# Patient Record
Sex: FEMALE | Race: WHITE | NOT HISPANIC OR LATINO | Employment: OTHER | URBAN - METROPOLITAN AREA
[De-identification: names, ages, dates, MRNs, and addresses within clinical notes are randomized per-mention and may not be internally consistent; named-entity substitution may affect disease eponyms.]

---

## 2019-09-26 ENCOUNTER — OFFICE VISIT (OUTPATIENT)
Dept: ENDOCRINOLOGY | Facility: CLINIC | Age: 74
End: 2019-09-26

## 2019-09-26 VITALS
OXYGEN SATURATION: 99 % | DIASTOLIC BLOOD PRESSURE: 66 MMHG | WEIGHT: 213 LBS | HEART RATE: 74 BPM | SYSTOLIC BLOOD PRESSURE: 108 MMHG

## 2019-09-26 DIAGNOSIS — E04.1 SOLITARY THYROID NODULE: ICD-10-CM

## 2019-09-26 DIAGNOSIS — E05.90 HYPERTHYROIDISM: Primary | ICD-10-CM

## 2019-09-26 PROBLEM — I48.91 ATRIAL FIBRILLATION (HCC): Status: ACTIVE | Noted: 2019-09-26

## 2019-09-26 PROBLEM — I10 ESSENTIAL HYPERTENSION: Status: ACTIVE | Noted: 2019-09-26

## 2019-09-26 PROCEDURE — G0008 ADMIN INFLUENZA VIRUS VAC: HCPCS | Performed by: INTERNAL MEDICINE

## 2019-09-26 PROCEDURE — 90653 IIV ADJUVANT VACCINE IM: CPT | Performed by: INTERNAL MEDICINE

## 2019-09-26 PROCEDURE — 99213 OFFICE O/P EST LOW 20 MIN: CPT | Performed by: INTERNAL MEDICINE

## 2019-09-26 RX ORDER — METHIMAZOLE 5 MG/1
TABLET ORAL
Refills: 5 | COMMUNITY
Start: 2019-09-10 | End: 2019-09-26 | Stop reason: SDUPTHER

## 2019-09-26 RX ORDER — LOSARTAN POTASSIUM 100 MG/1
100 TABLET ORAL DAILY
Refills: 2 | COMMUNITY
Start: 2019-08-28

## 2019-09-26 RX ORDER — IRON POLYSACCHARIDE COMPLEX 150 MG
150 CAPSULE ORAL 2 TIMES DAILY
Refills: 11 | COMMUNITY
Start: 2019-09-21

## 2019-09-26 RX ORDER — CALCIUM ACETATE 667 MG/1
667 CAPSULE ORAL 2 TIMES DAILY
Refills: 11 | COMMUNITY
Start: 2019-09-21

## 2019-09-26 RX ORDER — FUROSEMIDE 40 MG/1
40 TABLET ORAL 2 TIMES DAILY
Refills: 1 | COMMUNITY
Start: 2019-06-25 | End: 2022-02-16

## 2019-09-26 RX ORDER — AMLODIPINE BESYLATE 2.5 MG/1
2.5 TABLET ORAL DAILY
Refills: 11 | COMMUNITY
Start: 2019-09-21

## 2019-09-26 RX ORDER — METHIMAZOLE 5 MG/1
5 TABLET ORAL DAILY
Qty: 90 TABLET | Refills: 1 | Status: SHIPPED | OUTPATIENT
Start: 2019-09-26 | End: 2020-04-20

## 2019-09-26 RX ORDER — METOPROLOL SUCCINATE 25 MG/1
25 TABLET, EXTENDED RELEASE ORAL DAILY
Refills: 11 | COMMUNITY
Start: 2019-09-21 | End: 2021-03-29

## 2019-09-26 NOTE — PROGRESS NOTES
Chief Complaint   Patient presents with   • Follow-up   • Hyperthyroidism        HPI   Mariposa Scales is a 74 y.o. female had concerns including Follow-up and Hyperthyroidism.    Here today for follow-up on hyperthyroidism.  She has a history of undetectable TSH.  Most recent TSH has trended up slightly to 4.5 from 8/20/2019.  Free T4 slightly low at 0.68.  Currently she is on methimazole 7.5 mg daily.  She had an uptake and scan on 1119 which showed normal uptake values with a mildly nodular increased thyroid uptake in the left upper lobe.  Thyroid ultrasound was completed on 2/21/2019 and showed a left 0.9 x 0.6 x 0.9 cm nodule with a diffusely heterogeneous thyroid.  Thyroid antibodies were checked in the past and were normal.  She is feeling well but does complain of some increase in fatigue.  Denies other new concerns or complaints today.    The following portions of the patient's history were reviewed and updated as appropriate: allergies, current medications, past family history, past medical history, past social history, past surgical history and problem list.    Review of Systems   Constitutional: Positive for fatigue.   HENT: Negative.    Respiratory: Negative.    Cardiovascular: Negative.    Gastrointestinal: Negative.    Endocrine: Negative.    Genitourinary: Negative.    Musculoskeletal: Negative.    Skin: Negative.    Allergic/Immunologic: Negative.    Neurological: Negative for dizziness, tremors, seizures, syncope, facial asymmetry, speech difficulty, weakness, light-headedness, numbness, headache, memory problem and confusion.   Hematological: Negative for adenopathy. Bruises/bleeds easily.   Psychiatric/Behavioral: Negative.       ROS was reviewed and verified. All other ROS negative.    /66   Pulse 74   Wt 96.6 kg (213 lb)   SpO2 99%   Breastfeeding? No      Physical Exam     Constitutional:  well developed; well nourished  no acute distress       Eyes: EOM intact  Conjunctiva: clear    Respiratory:  breathing is unlabored   Cardiovascular:  Not performed.   Chest:  Not performed.   Abdomen: Not performed.   : Not performed.   Musculoskeletal: negative findings:  ROM of all joints is normal, no deformities present   Skin: dry and warm   Neuro: normal without focal findings, mental status, speech normal, alert and oriented x3 and STEWART   Psych: oriented to time, place and person, mood and affect are within normal limits     Labs and imaging reviewed as outlined in the HPI.    Assessment and Plan    Mariposa was seen today for follow-up and hyperthyroidism.    Diagnoses and all orders for this visit:    Hyperthyroidism  Uptake and scan suggested possible toxic nodule, but ultrasound was not impressive for a nodule which is typically seen responsible for hyperthyroidism.  However, her thyroid antibodies are negative.  TSH was undetectable prior to treatment and she has a history of atrial fibrillation.  Most recent TSH trended up to 4.5 and free T4 slightly low at 0.68.  Decrease methimazole to 5 mg daily and recheck labs in 2 months.  -     methIMAzole (TAPAZOLE) 5 MG tablet; Take 1 tablet by mouth Daily.  -     TSH; Future  -     T4, Free; Future    Solitary thyroid nodule  Us from February 2019 showed a 0.9 0.6 x 0.9 cm left thyroid nodule.  Repeat ultrasound at follow-up.    Return in about 4 months (around 1/26/2020) for next scheduled follow up, Labs in 2 months. The patient was instructed to contact the clinic with any interval questions or concerns.    Ellie France,    Endocrinologist    Please note that portions of this document were completed with a voice recognition program. Efforts were made to edit the dictations, but occasionally words are mis-transcribed.

## 2019-09-26 NOTE — PATIENT INSTRUCTIONS
Decrease dose of methimazole to 5 mg (one tablet) daily and have labs rechecked in 2 months. If you haven't heard from our office a week after the labs were drawn, please call to ensure we received the results.

## 2019-11-26 ENCOUNTER — RESULTS ENCOUNTER (OUTPATIENT)
Dept: ENDOCRINOLOGY | Facility: CLINIC | Age: 74
End: 2019-11-26

## 2019-11-26 DIAGNOSIS — E05.90 HYPERTHYROIDISM: ICD-10-CM

## 2020-04-18 DIAGNOSIS — E05.90 HYPERTHYROIDISM: ICD-10-CM

## 2020-04-20 DIAGNOSIS — E05.90 HYPERTHYROIDISM: ICD-10-CM

## 2020-04-20 RX ORDER — METHIMAZOLE 5 MG/1
5 TABLET ORAL DAILY
Qty: 90 TABLET | Refills: 0 | Status: SHIPPED | OUTPATIENT
Start: 2020-04-20 | End: 2020-06-29 | Stop reason: SDUPTHER

## 2020-04-20 RX ORDER — METHIMAZOLE 5 MG/1
TABLET ORAL
Qty: 90 TABLET | Refills: 0 | Status: SHIPPED | OUTPATIENT
Start: 2020-04-20 | End: 2020-04-20 | Stop reason: SDUPTHER

## 2020-06-25 ENCOUNTER — OFFICE VISIT (OUTPATIENT)
Dept: ENDOCRINOLOGY | Facility: CLINIC | Age: 75
End: 2020-06-25

## 2020-06-25 ENCOUNTER — LAB (OUTPATIENT)
Dept: LAB | Facility: HOSPITAL | Age: 75
End: 2020-06-25

## 2020-06-25 VITALS
WEIGHT: 228.6 LBS | OXYGEN SATURATION: 98 % | DIASTOLIC BLOOD PRESSURE: 64 MMHG | SYSTOLIC BLOOD PRESSURE: 160 MMHG | HEART RATE: 74 BPM

## 2020-06-25 DIAGNOSIS — E04.0 GOITER DIFFUSE: Primary | ICD-10-CM

## 2020-06-25 DIAGNOSIS — E05.90 HYPERTHYROIDISM: ICD-10-CM

## 2020-06-25 PROCEDURE — 84443 ASSAY THYROID STIM HORMONE: CPT | Performed by: INTERNAL MEDICINE

## 2020-06-25 PROCEDURE — 99214 OFFICE O/P EST MOD 30 MIN: CPT | Performed by: INTERNAL MEDICINE

## 2020-06-25 PROCEDURE — 84439 ASSAY OF FREE THYROXINE: CPT | Performed by: INTERNAL MEDICINE

## 2020-06-25 PROCEDURE — 76536 US EXAM OF HEAD AND NECK: CPT | Performed by: INTERNAL MEDICINE

## 2020-06-25 NOTE — PROGRESS NOTES
Chief Complaint   Patient presents with   • Follow-up   • Hyperthyroidism and US        HPI   Mariposa Scales is a 74 y.o. female had concerns including Follow-up and Hyperthyroidism and US.      Patient here today for follow-up on hyperthyroidism.  She is also due for repeat thyroid ultrasound.    She is on methimazole 5 mg once daily.  Reports that she had her thyroid levels checked since her last visit here but we never received these labs.  This was several months ago.  She feels well and denies any new concerns or complaints.  Has persistent leg swelling, worse on the right than the left.  This has been chronic and persistent for many years.    Last thyroid ultrasound was completed in February 2019 and showed a left 0.9 x 0.6 x 0.9 cm nodule.  There was some suggestion on uptake and scan from 2/11/2019 that there may be increased uptake in the left upper lobe of the thyroid.  Thyroid antibodies have been negative.    The following portions of the patient's history were reviewed and updated as appropriate: allergies, current medications, past family history, past medical history, past social history, past surgical history and problem list.    Review of Systems   Constitutional: Negative.    HENT: Negative.    Eyes: Negative.    Respiratory: Negative.    Cardiovascular: Positive for leg swelling (R leg has been swelling).   Gastrointestinal: Negative.    Endocrine: Negative.    Genitourinary: Negative.    Musculoskeletal: Negative.    Skin: Negative.    Allergic/Immunologic: Negative.    Neurological: Negative.    Hematological: Negative.    Psychiatric/Behavioral: Negative.       ROS was reviewed and verified. All other ROS negative.    /64   Pulse 74   Wt 104 kg (228 lb 9.6 oz)   SpO2 98%      Physical Exam     Constitutional:  well developed; well nourished  no acute distress  obese - There is no height or weight on file to calculate BMI.   ENT/Thyroid: no thyromegaly  no palpable nodules   Eyes: EOM  intact  Conjunctiva: clear   Respiratory:  breathing is unlabored  clear to auscultation bilaterally   Cardiovascular:  regular rate and rhythm, S1, S2 normal, no murmur, click, rub or gallop   Chest:  Not performed.   Abdomen: Not performed.   : Not performed.   Musculoskeletal: negative findings:  ROM of all joints is normal, no deformities present   Skin: dry and warm   Neuro: normal without focal findings, mental status, speech normal, alert and oriented x3 and STEWART   Psych: oriented to time, place and person, mood and affect are within normal limits     Thyroid Ultrasound 20    Indication: hyperthyroidism, thyroid nodule   Comparison Imagin2019  Clinical History: left thyroid nodule    Real time high resolution imaging of the thyroid gland was performed in transverse and longitudinal planes. Previous imaging reports were reviewed if available and compared to the current to assess stability.     Lobes:  The right lobe measured 5.9 cm L x 1.7 cm AP x 3.2 cm in TV dimension.    The isthmus measured 0.6 cm in thickness.    The left thyroid lobe measured 7.5 cm L x 2.5 cm AP x 3.1 cm in TV dimension.    Thyroid gland is heterogeneous and contains no distinct nodules.    Nodule 1   There is a pseudo-nodule in the right mid lobe and measures 0.5 x 0.5 x 0.4 cm (L X AP X TV).  This nodule is solid, heterogeneous, hypoechoic with ill-defined margins, and Grade I vascularity on Color Flow Doppler.  It has no artifacts    No pathologic lymph nodes were seen.    Impression:  The entire thyroid is diffusely enlarged and slightly heterogeneous, consistent with a diffuse goiter, with some pseudo-nodules in the right and left lobes.  Right lobe more distinct pseudo-nodular areas measured at 0.5 cm but otherwise no distinct nodules were noted.    Recommendation:  No routine ultrasound monitoring is necessary.      Assessment and Plan    Mariposa was seen today for follow-up and hyperthyroidism and us.    Diagnoses  and all orders for this visit:    Goiter diffuse  See ultrasound report as above.  Patient has a diffuse goiter with no distinct nodules, some pseudo-nodular areas.    No routine ultrasound monitoring is necessary.  -     US Thyroid    Hyperthyroidism  Due to suspected antibody negative Graves' disease despite uptake and scan from February 2019 suggesting increased uptake in the left upper lobe.  She does not have any left upper lobe nodules on exam.  Clinically euthyroid on methimazole 5 mg daily.  Recheck TFTs and titrate methimazole if needed.  -     TSH  -     T4, Free         Return in about 7 months (around 1/25/2021) for next scheduled follow up. The patient was instructed to contact the clinic with any interval questions or concerns.    Ellie France, DO   Endocrinologist    Please note that portions of this document were completed with a voice recognition program. Efforts were made to edit the dictations, but occasionally words are mis-transcribed.

## 2020-06-26 LAB
T4 FREE SERPL-MCNC: 0.88 NG/DL (ref 0.93–1.7)
TSH SERPL DL<=0.05 MIU/L-ACNC: 1.24 UIU/ML (ref 0.27–4.2)

## 2020-06-29 DIAGNOSIS — E05.90 HYPERTHYROIDISM: ICD-10-CM

## 2020-06-29 RX ORDER — METHIMAZOLE 5 MG/1
TABLET ORAL
Qty: 90 TABLET | Refills: 1 | Status: SHIPPED | OUTPATIENT
Start: 2020-06-29 | End: 2020-12-28 | Stop reason: SDUPTHER

## 2020-08-06 ENCOUNTER — TELEPHONE (OUTPATIENT)
Dept: ENDOCRINOLOGY | Facility: CLINIC | Age: 75
End: 2020-08-06

## 2020-08-06 NOTE — TELEPHONE ENCOUNTER
PATIENT HAD LABS DRAWN AT [facility] THIS MORNING AND WAS TOLD THAT THIS OFFICE WOULD NEED TO CALL AND REQUEST TO HAVE THESE RESULTS SENT TO US INSTEAD OF THAT LAB FAXING THE RESULTS TO US.

## 2020-08-06 NOTE — TELEPHONE ENCOUNTER
Returned patient call, asked that she contact [facility] and have them fax a copy of the labs to Dr France  Patient expressed understanding

## 2020-08-06 NOTE — TELEPHONE ENCOUNTER
NERY  SHE CALLED TO SAY THAT THE PT HAD LABS DRAWN TODAY AT [facility]    HER NUMBER IF YOU HAVE QUESTIONS [phone]

## 2020-08-10 ENCOUNTER — TELEPHONE (OUTPATIENT)
Dept: ENDOCRINOLOGY | Facility: CLINIC | Age: 75
End: 2020-08-10

## 2020-08-10 ENCOUNTER — RESULTS ENCOUNTER (OUTPATIENT)
Dept: ENDOCRINOLOGY | Facility: CLINIC | Age: 75
End: 2020-08-10

## 2020-08-10 DIAGNOSIS — E05.90 HYPERTHYROIDISM: ICD-10-CM

## 2020-08-10 NOTE — TELEPHONE ENCOUNTER
Please let the patient know that I received the labs from last week and her thyroid levels are now in an optimal range.  Continue the current dose of methimazole. Recheck the level at follow-up visit.

## 2020-12-28 ENCOUNTER — OFFICE VISIT (OUTPATIENT)
Dept: ENDOCRINOLOGY | Facility: CLINIC | Age: 75
End: 2020-12-28

## 2020-12-28 VITALS
HEIGHT: 63 IN | WEIGHT: 232 LBS | SYSTOLIC BLOOD PRESSURE: 140 MMHG | BODY MASS INDEX: 41.11 KG/M2 | DIASTOLIC BLOOD PRESSURE: 80 MMHG | TEMPERATURE: 96.8 F

## 2020-12-28 DIAGNOSIS — E11.9 TYPE 2 DIABETES MELLITUS WITHOUT COMPLICATION, WITHOUT LONG-TERM CURRENT USE OF INSULIN (HCC): ICD-10-CM

## 2020-12-28 DIAGNOSIS — E66.01 MORBID OBESITY (HCC): ICD-10-CM

## 2020-12-28 DIAGNOSIS — E05.90 HYPERTHYROIDISM: Primary | ICD-10-CM

## 2020-12-28 PROBLEM — IMO0002 DIABETES MELLITUS TYPE 2, UNCONTROLLED, WITH COMPLICATIONS: Status: ACTIVE | Noted: 2020-12-28

## 2020-12-28 LAB
EXPIRATION DATE: NORMAL
HBA1C MFR BLD: 7.2 %
Lab: NORMAL

## 2020-12-28 PROCEDURE — 83036 HEMOGLOBIN GLYCOSYLATED A1C: CPT | Performed by: INTERNAL MEDICINE

## 2020-12-28 PROCEDURE — 99214 OFFICE O/P EST MOD 30 MIN: CPT | Performed by: INTERNAL MEDICINE

## 2020-12-28 RX ORDER — METHIMAZOLE 5 MG/1
TABLET ORAL
Qty: 90 TABLET | Refills: 1 | Status: SHIPPED | OUTPATIENT
Start: 2020-12-28 | End: 2021-12-21

## 2020-12-28 RX ORDER — BLOOD-GLUCOSE METER
1 KIT MISCELLANEOUS DAILY
Qty: 1 EACH | Refills: 0 | Status: SHIPPED | OUTPATIENT
Start: 2020-12-28

## 2020-12-28 NOTE — PROGRESS NOTES
"Chief Complaint   Patient presents with   • Follow-up   • Diabetes   • Hyperthyroidism        HPI   Mariposa Scales is a 75 y.o. female had concerns including Follow-up, Diabetes, and Hyperthyroidism.      Here today for follow-up on hyperthyroidism.    Patient also indicated she has a history of type 2 diabetes which has not been followed recently.  A1c was checked today and was 7.2, confirming DM2 with hyperglycemia.  Last ophtho exam was about a year ago.  Patient denies any complications related to the diabetes.  She does have CKD for which she is following with nephrology.  Pt has a history of DM2 and was on metformin in the past though was taken off as BGs were reportedly good.  Thinks this was diagnosed several years ago.   Diet:   Breakfast: biscuit  Lunch: vegetables or lite snack  Dinner: hamburger/meatloaf, chicken (baked mostly), vegetables - green beans, baked or mashed potatoes  Snacks: fruit recently  Drinks: water, sweet tea on rare occasion, coffee with sugar on occasion, rare coke    She lost down to 200 pounds in the past but has since not controlled her diet as closely.    Patient also has hyperthyroidism and is taking methimazole 5 mg 5 days/week.  Last TFTs from June were normal.    She feels well and denies any new concerns or complaints today.    The following portions of the patient's history were reviewed and updated as appropriate: allergies, current medications, past family history, past medical history, past social history, past surgical history and problem list.    Review of Systems   Constitutional: Negative.    HENT: Negative.    Eyes: Negative.    Respiratory: Negative.    Cardiovascular: Negative.    Gastrointestinal: Negative.    Endocrine: Negative.    Genitourinary: Negative.    Musculoskeletal: Negative.       ROS was reviewed and verified. All other ROS negative.    /80   Temp 96.8 °F (36 °C) (Infrared)   Ht 160 cm (63\")   Wt 105 kg (232 lb)   BMI 41.10 kg/m²  "     Physical Exam     Constitutional:  well developed; well nourished  no acute distress  obese - Body mass index is 41.1 kg/m².   ENT/Thyroid: no thyromegaly  no palpable nodules   Eyes: EOM intact  Conjunctiva: clear   Respiratory:  breathing is unlabored  clear to auscultation bilaterally   Cardiovascular:  lower extremity edema: 2+ edema  regular rate and rhythm, S1, S2 normal, no murmur, click, rub or gallop   Chest:  Not performed.   Abdomen: Not performed.   : Not performed.   Musculoskeletal: negative findings:  ROM of all joints is normal, no deformities present   Skin: dry and warm   Neuro: normal without focal findings and mental status, speech normal, alert and oriented x3   Psych: oriented to time, place and person, mood and affect are within normal limits       TSH  Lab Results   Component Value Date    TSH 1.240 06/25/2020       T4  Lab Results   Component Value Date    FREET4 0.88 (L) 06/25/2020     Hemoglobin A1C   Date Value Ref Range Status   12/28/2020 7.2 % Final       Assessment and Plan    Diagnoses and all orders for this visit:    1. Hyperthyroidism (Primary)  -     T4, Free; Future  -     TSH; Future  -     methIMAzole (TAPAZOLE) 5 MG tablet; 5 mg by mouth daily 5 days per week, skip the other two days  Dispense: 90 tablet; Refill: 1  Due to suspected antibody negative Graves' disease.  Last ultrasound from 6/25/2020 showed pseudonodules but no distinct nodule.  Uptake and scan from February 2019 suggested increased uptake in the left lobe which is not correlating with nodules at this time.  Clinically euthyroid.  Recheck TFTs.  Continue methimazole 5 mg 5 days/week.    2. Type 2 diabetes mellitus without complication, without long-term current use of insulin (CMS/[facility])  Diagnosed 5 years ago but patient was unaware that she may still have diabetes.   A1c 7.2 today confirms DM2 with hyperglycemia.    Discussed dietary modifications.  Offered diabetes education, which she declined at this  time.  Decrease carbohydrate intake with a goal for weight loss.  Eliminate regular soda and sweetened beverages.  Prescription sent for glucometer.  Check BG's once daily at varied times.  Call the office if consistently greater than 180.  Ophtho exam is due and patient was encouraged to schedule.  Labs updated at nephrology next week.  Urine microalbumin should be checked yearly.  Check monofilament at follow-up visit.  -     POC Glycosylated Hemoglobin (Hb A1C)  -     glucose monitor monitoring kit; 1 each Daily.  Dispense: 1 each; Refill: 0  -     glucose blood test strip; daily  Dispense: 100 each; Refill: 3  -     Lancets Misc. kit; Use to monitor once daily  Dispense: 100 each; Refill: 3    3. Morbid obesity (CMS/[facility])  BMI 41.1.  Weight loss is necessary for overall health and diabetes management.  Dietary modifications discussed as above.        Return in about 3 months (around 3/28/2021) for next scheduled follow up. The patient was instructed to contact the clinic with any interval questions or concerns.    Ellie France, DO   Endocrinologist    Please note that portions of this document were completed with a voice recognition program. Efforts were made to edit the dictations, but occasionally words are mis-transcribed.

## 2021-01-04 ENCOUNTER — TELEPHONE (OUTPATIENT)
Dept: ENDOCRINOLOGY | Facility: CLINIC | Age: 76
End: 2021-01-04

## 2021-01-04 NOTE — TELEPHONE ENCOUNTER
PATIENT HAD LABS DRAWN LAST WEEK AT [facility]. THEY NEED FOR US TO LOOK INTO GETTING THOSE RESULTS. PATIENTS NUMBER [phone]

## 2021-03-29 ENCOUNTER — OFFICE VISIT (OUTPATIENT)
Dept: ENDOCRINOLOGY | Facility: CLINIC | Age: 76
End: 2021-03-29

## 2021-03-29 VITALS
WEIGHT: 227 LBS | BODY MASS INDEX: 40.22 KG/M2 | DIASTOLIC BLOOD PRESSURE: 74 MMHG | TEMPERATURE: 98.2 F | HEART RATE: 80 BPM | SYSTOLIC BLOOD PRESSURE: 110 MMHG | HEIGHT: 63 IN

## 2021-03-29 DIAGNOSIS — E05.90 HYPERTHYROIDISM: Primary | ICD-10-CM

## 2021-03-29 DIAGNOSIS — E66.01 MORBID OBESITY (HCC): ICD-10-CM

## 2021-03-29 DIAGNOSIS — E11.9 TYPE 2 DIABETES MELLITUS WITHOUT COMPLICATION, WITHOUT LONG-TERM CURRENT USE OF INSULIN (HCC): ICD-10-CM

## 2021-03-29 LAB
EXPIRATION DATE: NORMAL
HBA1C MFR BLD: 6.8 %
Lab: NORMAL

## 2021-03-29 PROCEDURE — 83036 HEMOGLOBIN GLYCOSYLATED A1C: CPT | Performed by: INTERNAL MEDICINE

## 2021-03-29 PROCEDURE — 99214 OFFICE O/P EST MOD 30 MIN: CPT | Performed by: INTERNAL MEDICINE

## 2021-03-29 RX ORDER — METOPROLOL SUCCINATE 50 MG/1
50 TABLET, EXTENDED RELEASE ORAL DAILY
COMMUNITY

## 2021-03-29 NOTE — PROGRESS NOTES
"Chief Complaint   Patient presents with   • Hyperthyroidism   • Diabetes        HPI   Mariposa Scales is a 75 y.o. female had concerns including Hyperthyroidism and Diabetes.      She had to have her fistula repaired. Is not on dialysis.   Has hematuria and she has a cystoscopy scheduled on 4/5/21.    Has just changed her diet - using olive oil instead of \"grease\" and eating more chicken and fish.     Checks her glucose levels a few times per week and they range - mid to low 100s.   A1c down to 6.8 from 7.2.  She discontinued regular soda a week ago.  Still drinking juice regularly.    Takes methimazole 5 mg 5 days/week.  Is due for repeat TFTs.    The following portions of the patient's history were reviewed and updated as appropriate: allergies, current medications, past family history, past medical history, past social history, past surgical history and problem list.    Review of Systems   Constitutional: Positive for fatigue.   Genitourinary: Positive for hematuria.   Musculoskeletal: Positive for arthralgias.        /74 (BP Location: Right arm, Patient Position: Sitting, Cuff Size: Adult)   Pulse 80   Temp 98.2 °F (36.8 °C) (Infrared)   Ht 160 cm (63\")   Wt 103 kg (227 lb)   BMI 40.21 kg/m²      Physical Exam  Vitals reviewed.   Constitutional:       Appearance: Normal appearance. She is obese.   HENT:      Head: Normocephalic and atraumatic.   Cardiovascular:      Rate and Rhythm: Normal rate.   Pulmonary:      Effort: Pulmonary effort is normal.   Feet:      Comments: Boot on right foot  Neurological:      Mental Status: She is alert.   Psychiatric:         Mood and Affect: Mood normal.         Behavior: Behavior normal.          TSH  Lab Results   Component Value Date    TSH 1.240 06/25/2020       T4  Lab Results   Component Value Date    FREET4 0.88 (L) 06/25/2020     HbA1c:  Lab Results   Component Value Date    HGBA1C 6.8 03/29/2021    HGBA1C 7.2 12/28/2020       Assessment and Plan    Diagnoses " and all orders for this visit:    1. Hyperthyroidism (Primary)  Due to suspected antibody negative Graves' disease.  Thyroid ultrasound from 6/25/2020 confirmed pseudonodules but no distinct nodules.  Uptake and scan from February 2019 showed increased uptake on the left but not correlating with any thyroid nodules.  Recheck TFTs and titrate methimazole if needed.    Current dose of methimazole 5 mg 5 days/week.  -     T4, Free; Future  -     TSH; Future    2. Type 2 diabetes mellitus without complication, without long-term current use of insulin (CMS/HCC)  Controlled with A1c down to 6.8.  Improved due to dietary modifications.  Continue dietary control.  Eliminate regular sweetened beverages.  No medications are needed at this time.  Patient has a history of CKD.  Ophtho exam is due next month.  Defer urine microalbumin as patient has current hematuria and undergoing work-up with urology.  Check monofilament at follow-up visit this patient is currently wearing a boot/splint.  -     Comprehensive Metabolic Panel; Future  -     CBC (No Diff); Future  -     Lipid Panel; Future  -     POC Glycosylated Hemoglobin (Hb A1C)    3. Morbid obesity (CMS/HCC)  BMI 40.2.  Weight loss of 5 pounds since her last visit here.  Encouraged to continue efforts for weight loss.  Discussed dietary modifications.       Return in about 3 months (around 6/29/2021). The patient was instructed to contact the clinic with any interval questions or concerns.    Ellie France, DO   Endocrinologist    Please note that portions of this document were completed with a voice recognition program. Efforts were made to edit the dictations, but occasionally words are mis-transcribed.

## 2021-05-28 ENCOUNTER — TELEPHONE (OUTPATIENT)
Dept: ENDOCRINOLOGY | Facility: CLINIC | Age: 76
End: 2021-05-28

## 2021-05-28 NOTE — TELEPHONE ENCOUNTER
FYI  pts daughter called to say that she had labs drawn at [facility]- I called and requested these to be faxed to us

## 2021-12-21 DIAGNOSIS — E05.90 HYPERTHYROIDISM: ICD-10-CM

## 2021-12-21 RX ORDER — METHIMAZOLE 5 MG/1
TABLET ORAL
Qty: 30 TABLET | Refills: 0 | Status: SHIPPED | OUTPATIENT
Start: 2021-12-21 | End: 2022-01-31

## 2022-01-30 DIAGNOSIS — E05.90 HYPERTHYROIDISM: ICD-10-CM

## 2022-01-31 RX ORDER — METHIMAZOLE 5 MG/1
TABLET ORAL
Qty: 30 TABLET | Refills: 0 | Status: SHIPPED | OUTPATIENT
Start: 2022-01-31 | End: 2022-02-16 | Stop reason: SDUPTHER

## 2022-02-16 ENCOUNTER — OFFICE VISIT (OUTPATIENT)
Dept: ENDOCRINOLOGY | Facility: CLINIC | Age: 77
End: 2022-02-16

## 2022-02-16 VITALS
OXYGEN SATURATION: 98 % | SYSTOLIC BLOOD PRESSURE: 120 MMHG | HEIGHT: 63 IN | BODY MASS INDEX: 38.98 KG/M2 | HEART RATE: 107 BPM | DIASTOLIC BLOOD PRESSURE: 78 MMHG | WEIGHT: 220 LBS

## 2022-02-16 DIAGNOSIS — E05.90 HYPERTHYROIDISM: Primary | ICD-10-CM

## 2022-02-16 DIAGNOSIS — E11.9 TYPE 2 DIABETES MELLITUS WITHOUT COMPLICATION, WITHOUT LONG-TERM CURRENT USE OF INSULIN: ICD-10-CM

## 2022-02-16 DIAGNOSIS — E66.9 OBESITY (BMI 30-39.9): ICD-10-CM

## 2022-02-16 PROCEDURE — 99214 OFFICE O/P EST MOD 30 MIN: CPT | Performed by: INTERNAL MEDICINE

## 2022-02-16 RX ORDER — FUROSEMIDE 80 MG
80 TABLET ORAL DAILY
COMMUNITY
Start: 2021-12-19

## 2022-02-16 RX ORDER — METHIMAZOLE 5 MG/1
TABLET ORAL
Qty: 30 TABLET | Refills: 1 | Status: SHIPPED | OUTPATIENT
Start: 2022-02-16 | End: 2022-09-23

## 2022-02-16 NOTE — PROGRESS NOTES
"Chief Complaint   Patient presents with   • Hyperthyroidism        HPI   Mariposa Scales is a 76 y.o. female had concerns including Hyperthyroidism.      Patient here today for follow-up.  Last visit was almost 1 year ago.  She is on methimazole 5 mg 5 days/week.  Is due for labs.  She also has type 2 diabetes.  Hasn't had an A1 checked recently c.   Checks glucose on occasion - this morning was 147.   Is still drinking regular beverages. She felt bad a few days after breakfast when she had juice.     Weight is down 7 lbs since her last visit though she has regained. Weight was as low as 206 lb.   Thinks she had COVID in December.       The following portions of the patient's history were reviewed and updated as appropriate: allergies, current medications, past family history, past medical history, past social history, past surgical history and problem list.    Review of Systems   Constitutional: Positive for fatigue.   Cardiovascular: Positive for leg swelling.   Endocrine:        See HPI   Skin: Positive for wound.        /78   Pulse 107   Ht 160 cm (63\")   Wt 99.8 kg (220 lb)   SpO2 98%   BMI 38.97 kg/m²      Physical Exam  Vitals reviewed.   Constitutional:       Appearance: Normal appearance. She is obese.      Comments: Body mass index is 38.97 kg/m².   Cardiovascular:      Rate and Rhythm: Normal rate.      Pulses:           Dorsalis pedis pulses are 1+ on the right side and 1+ on the left side.   Pulmonary:      Effort: Pulmonary effort is normal.   Musculoskeletal:      Right lower leg: Edema (2+) present.      Left lower leg: Edema (2+) present.   Feet:      Right foot:      Skin integrity: Blister (right pinky toe), skin breakdown and dry skin present.      Toenail Condition: Right toenails are abnormally thick and long.      Left foot:      Skin integrity: Dry skin present.      Toenail Condition: Left toenails are abnormally thick and long.      Comments: Monofilament 2/5 " bilaterally    Diabetic Foot Exam Performed and Monofilament Test Performed      Neurological:      General: No focal deficit present.      Mental Status: She is alert. Mental status is at baseline.   Psychiatric:         Mood and Affect: Mood normal.         Behavior: Behavior normal.            TSH  Lab Results   Component Value Date    TSH 1.240 06/25/2020       T4  Lab Results   Component Value Date    FREET4 0.88 (L) 06/25/2020       Assessment and Plan    Diagnoses and all orders for this visit:    1. Hyperthyroidism (Primary)  Due to antibody negative Graves' disease.  Ultrasound completed in 2020 shows no distinct nodules.  Clinically euthyroid on methimazole 5 mg 5 days/week.  Repeat TFTs.  Can defer monitoring to PCP, needs to be checked at least every 4 to 5 months.  If stable PCP can follow.  -     TSH; Future  -     T4, Free; Future  -     methIMAzole (TAPAZOLE) 5 MG tablet; TAKE 1 TABLET BY MOUTH ONCE DAILY 5 DAYS PER WEEK. SKIP THE OTHER 2 DAYS.  Dispense: 30 tablet; Refill: 1    2. Type 2 diabetes mellitus without complication, without long-term current use of insulin ([facility])  Unknown control.  A1c is due and will check with the labs.  She needs to eliminate regular sweetened beverages.  Goal for weight loss.  Lab order was given.  Ophtho exam should be updated yearly.  Monofilament updated in the office today.  She needs to see podiatry for routine foot maintenance.  -     CBC (No Diff); Future  -     Comprehensive Metabolic Panel; Future  -     Lipid Panel; Future  -     Microalbumin / Creatinine Urine Ratio - Urine, Clean Catch; Future  -     Hemoglobin A1c; Future    3. Obesity (BMI 30-39.9)  BMI 39.  7 pound weight loss since her last visit here but she has in fact gained over 10 pounds after some weight loss.  Eliminate regular sweetened beverages.  Recommended she see a nutritionist and patient prefers to do this locally.  Weight loss as needed for overall for diabetes management.       Return  in about 4 months (around 6/16/2022) for next scheduled follow up. The patient was instructed to contact the clinic with any interval questions or concerns.    Ellie France, DO   Endocrinologist    Please note that portions of this note were completed with a voice recognition program.

## 2022-03-21 ENCOUNTER — TELEPHONE (OUTPATIENT)
Dept: ENDOCRINOLOGY | Facility: CLINIC | Age: 77
End: 2022-03-21

## 2022-03-21 NOTE — TELEPHONE ENCOUNTER
I reviewed the labs.  These unfortunately were not the labs that are ordered although Carlyle entered my name as the ordering provider.  Her kidney function is significantly declined. Please be sure her nephrologist is aware of the recent levels.   Vitamin D in a normal range. Blood count low (anemia). Suspect in part due to kidney disease. Follow-up with PCP/nephrology.         3/8/2022 CBC with hemoglobin 11.5, MCV 95, BMP with glucose 123, creatinine 4.96, GFR 9, phosphorus 5.6, albumin 4.5, ferritin 92.8, 25 hydroxy vitamin D 53.7

## 2022-03-21 NOTE — TELEPHONE ENCOUNTER
They called to say that she had labs drawn at [facility]-I called to have them fax these results to us    Please call pt  With the results [phone]

## 2022-03-22 NOTE — TELEPHONE ENCOUNTER
Tried to call patient and she was not home.  Spoke with patients daughter Katie.  Release signed in chart.  She verbalized understanding.

## 2022-09-22 DIAGNOSIS — E05.90 HYPERTHYROIDISM: ICD-10-CM

## 2022-09-23 RX ORDER — METHIMAZOLE 5 MG/1
TABLET ORAL
Qty: 30 TABLET | Refills: 0 | Status: SHIPPED | OUTPATIENT
Start: 2022-09-23 | End: 2022-11-08

## 2022-11-08 DIAGNOSIS — E05.90 HYPERTHYROIDISM: ICD-10-CM

## 2022-11-08 RX ORDER — METHIMAZOLE 5 MG/1
TABLET ORAL
Qty: 30 TABLET | Refills: 0 | Status: SHIPPED | OUTPATIENT
Start: 2022-11-08

## 2023-05-15 DIAGNOSIS — E05.90 HYPERTHYROIDISM: ICD-10-CM

## 2023-05-15 RX ORDER — METHIMAZOLE 5 MG/1
TABLET ORAL
Qty: 30 TABLET | Refills: 0 | OUTPATIENT
Start: 2023-05-15